# Patient Record
Sex: MALE | Race: WHITE | Employment: FULL TIME | URBAN - NONMETROPOLITAN AREA
[De-identification: names, ages, dates, MRNs, and addresses within clinical notes are randomized per-mention and may not be internally consistent; named-entity substitution may affect disease eponyms.]

---

## 2022-09-17 ENCOUNTER — HOSPITAL ENCOUNTER (EMERGENCY)
Age: 32
Discharge: HOME | End: 2022-09-18
Attending: STUDENT IN AN ORGANIZED HEALTH CARE EDUCATION/TRAINING PROGRAM
Payer: COMMERCIAL

## 2022-09-17 VITALS
RESPIRATION RATE: 18 BRPM | BODY MASS INDEX: 29.66 KG/M2 | WEIGHT: 219 LBS | SYSTOLIC BLOOD PRESSURE: 130 MMHG | TEMPERATURE: 99 F | HEIGHT: 72 IN | HEART RATE: 111 BPM | OXYGEN SATURATION: 97 % | DIASTOLIC BLOOD PRESSURE: 83 MMHG

## 2022-09-17 DIAGNOSIS — J06.9 VIRAL UPPER RESPIRATORY TRACT INFECTION: Primary | ICD-10-CM

## 2022-09-17 PROCEDURE — 87804 INFLUENZA ASSAY W/OPTIC: CPT

## 2022-09-17 PROCEDURE — 6370000000 HC RX 637 (ALT 250 FOR IP): Performed by: STUDENT IN AN ORGANIZED HEALTH CARE EDUCATION/TRAINING PROGRAM

## 2022-09-17 PROCEDURE — 99283 EMERGENCY DEPT VISIT LOW MDM: CPT

## 2022-09-17 PROCEDURE — 87635 SARS-COV-2 COVID-19 AMP PRB: CPT

## 2022-09-17 RX ORDER — ACETAMINOPHEN 325 MG/1
650 TABLET ORAL ONCE
Status: COMPLETED | OUTPATIENT
Start: 2022-09-17 | End: 2022-09-17

## 2022-09-17 RX ADMIN — ACETAMINOPHEN 650 MG: 325 TABLET ORAL at 23:29

## 2022-09-17 ASSESSMENT — PAIN - FUNCTIONAL ASSESSMENT: PAIN_FUNCTIONAL_ASSESSMENT: NONE - DENIES PAIN

## 2022-09-18 LAB
FLU A ANTIGEN: NEGATIVE
FLU B ANTIGEN: NEGATIVE
SARS-COV-2, NAAT: NOT  DETECTED

## 2022-09-18 NOTE — ED PROVIDER NOTES
[facility] [address] [address] EMERGENCY DEPT  EMERGENCY DEPARTMENT     Pt Name: Mindy Schumacher  MRN: [id number]  [place] 1990  Date of evaluation: 9/17/2022  Provider: Rachel Browne MD,     [address] [place] [address]       Chief Complaint   Patient presents with    Covid Testing    Cough    Congestion       HISTORY OF PRESENT ILLNESS    Mindy Schumacher is a 32 y.o. male who presents to the emergency department from home with chief complaint of nasal congestion, rhinorrhea, nonproductive cough, sore throat, generalized body aches for the last 48 hours. He reports chills and fevers. He denies headache or neck pain. He denies abdominal pain, nausea, vomiting, diarrhea, constipation. He denies rash. Triage notes and Nursing notes were reviewed by myself. Any discrepancies are addressed above. PAST MEDICAL HISTORY   History reviewed. No pertinent past medical history. SURGICAL HISTORY     History reviewed. No pertinent surgical history. CURRENT MEDICATIONS       Previous Medications    No medications on file       ALLERGIES     Patient has no known allergies. FAMILY HISTORY     History reviewed. No pertinent family history. SOCIAL HISTORY       Social History     Tobacco Use    Smoking status: Every Day     Packs/day: 1.00     Types: Cigarettes    Smokeless tobacco: Never   Substance and Sexual Activity    Alcohol use: Yes    Drug use: Never       REVIEW OF SYSTEMS     Review of Systems  - CONSTITUTIONAL: Reports fever and chills    - HEENT: Rhinorrhea, nasal congestion, sore throat    -   RESPIRATORY: Nonproductive cough      - CV: Denies palpitations and CP.       - GI: Denies abdominal pain, nausea, vomiting and diarrhea.      - : Denies dysuria and urinary frequency. - MSK: Generalized myalgias      - SKIN: Denies rash and pruritus.    -   NEUROLOGICAL: Denies headache and syncope. - PSYCHIATRIC: Denies recent changes in mood. Denies anxiety and depression.   Except as noted above the remainder of the review of systems was reviewed and is. PHYSICAL EXAM    (up to 7 for level 4, 8 or more for level 5)     ED Triage Vitals [09/17/22 2312]   BP Temp Temp Source Heart Rate Resp SpO2 Height Weight   130/83 99 °F (37.2 °C) Oral (!) 111 18 97 % 6' (1.829 m) 219 lb (99.3 kg)       Physical Exam  Constitutional:  Well developed, well nourished, no acute distress, non-toxic appearance   Eyes:  PERRL, conjunctiva normal   HENT:  Atraumatic, external ears normal, nose normal, oropharynx moist, no pharyngeal exudates. Clear rhinorrhea. Neck- normal range of motion, no tenderness, supple   Respiratory:  No respiratory distress, normal breath sounds, no rales, no wheezing   Cardiovascular: Tachycardia, normal rhythm, no murmurs, no gallops, no rubs    Musculoskeletal:  No edema, no tenderness, no deformities  Integument:  Well hydrated, no rash   Lymphatic:  No lymphadenopathy noted   Neurologic:  Alert & oriented x 3, no focal deficits noted   Psychiatric:  Speech and behavior appropriate  DIAGNOSTIC RESULTS     EKG:(none if blank)  All EKG's are interpreted [facility] Phys[facility] who either signs or Co-signs this chart in the absence of a cardiologist.    Not indicated    RADIOLOGY: (none if blank)   Interpretation per the Radiologistbelow, if available at the time of this note:    No orders to display       LABS:  Labs Reviewed   COVID-19, RAPID   RAPID INFLUENZA A/B ANTIGENS       All other labs were within normal range or not returned as of this dictation. Please note, any cultures that may have been sent were not resulted at the time of this patient visit. EMERGENCY DEPARTMENT COURSE andMedical Decision Making:     MDM  /  ED Course as of 09/18/22 0053   Sat Sep 17, 2022   2324 HPI/ROS/physical examination consistent with viral upper respiratory infection. Lungs clear to auscultation bilaterally no concern for underlying pneumonia considering recency of symptoms and other viral syndrome symptoms. Tylenol given in the ED. Plan for COVID-19 and influenza testing. [AM]   Sun Sep 18, 2022   0052 Flu and COVID-negative. Supportive care discussed. Return precaution discussed. PCP follow-up [AM]      ED Course User Index  [AM] Ann Joyce MD       Strict returnprecautions and follow up instructions were discussed with the patient with which the patient agrees    ED Medications administered this visit:    Medications   acetaminophen (TYLENOL) tablet 650 mg (has no administration in time range)         Procedures: (None if blank)       CLINICAL       1. Viral upper respiratory tract infection          DISPOSITION/PLAN   DISPOSITION    Home with PCP follow-up    PATIENT REFERRED TO:   [facility] [address] [place] [phone]  Call in 3 days          (Please note that portions of this note were completed with a voice recognition program.  Efforts were made to edit the dictations but occasionallywords are mis-transcribed. )      Ann Joyce MD,(electronically signed)  Attending Physician, Emergency Department         Ann Joyce MD  09/18/22 1234

## 2023-02-10 ENCOUNTER — APPOINTMENT (OUTPATIENT)
Dept: GENERAL RADIOLOGY | Age: 33
End: 2023-02-10
Payer: COMMERCIAL

## 2023-02-10 ENCOUNTER — HOSPITAL ENCOUNTER (EMERGENCY)
Age: 33
Discharge: HOME | End: 2023-02-10
Attending: STUDENT IN AN ORGANIZED HEALTH CARE EDUCATION/TRAINING PROGRAM
Payer: COMMERCIAL

## 2023-02-10 ENCOUNTER — APPOINTMENT (OUTPATIENT)
Dept: CT IMAGING | Age: 33
End: 2023-02-10
Payer: COMMERCIAL

## 2023-02-10 VITALS
OXYGEN SATURATION: 98 % | SYSTOLIC BLOOD PRESSURE: 104 MMHG | HEIGHT: 70 IN | HEART RATE: 84 BPM | TEMPERATURE: 97.7 F | DIASTOLIC BLOOD PRESSURE: 66 MMHG | BODY MASS INDEX: 28.63 KG/M2 | RESPIRATION RATE: 16 BRPM | WEIGHT: 200 LBS

## 2023-02-10 DIAGNOSIS — R41.82 ALTERED MENTAL STATUS, UNSPECIFIED ALTERED MENTAL STATUS TYPE: ICD-10-CM

## 2023-02-10 DIAGNOSIS — R56.9 SEIZURE-LIKE ACTIVITY (HCC): Primary | ICD-10-CM

## 2023-02-10 DIAGNOSIS — F12.90 MARIJUANA USE: ICD-10-CM

## 2023-02-10 LAB
ALBUMIN SERPL BCG-MCNC: 4.2 G/DL (ref 3.5–5.1)
ALP SERPL-CCNC: 97 U/L (ref 38–126)
ALT SERPL W/O P-5'-P-CCNC: 29 U/L (ref 11–66)
AMPHETAMINES UR QL SCN: NEGATIVE
ANION GAP SERPL CALC-SCNC: 13 MEQ/L (ref 8–16)
APAP SERPL-MCNC: < 5 UG/ML (ref 0–20)
AST SERPL-CCNC: 22 U/L (ref 5–40)
BACTERIA: ABNORMAL
BARBITURATES UR QL SCN: NEGATIVE
BENZODIAZ UR QL SCN: NEGATIVE
BILIRUB CONJ SERPL-MCNC: < 0.2 MG/DL (ref 0–0.3)
BILIRUB SERPL-MCNC: 0.3 MG/DL (ref 0.3–1.2)
BILIRUB UR QL STRIP: NEGATIVE
BUN SERPL-MCNC: 19 MG/DL (ref 7–22)
BZE UR QL SCN: NEGATIVE
CALCIUM SERPL-MCNC: 9 MG/DL (ref 8.5–10.5)
CANNABINOIDS UR QL SCN: POSITIVE
CASTS #/AREA URNS LPF: ABNORMAL /LPF
CASTS #/AREA URNS LPF: ABNORMAL /LPF
CHARACTER UR: CLEAR
CHARCOAL URNS QL MICRO: ABNORMAL
CHLORIDE SERPL-SCNC: 102 MEQ/L (ref 98–111)
CHP ED QC CHECK: YES
CK SERPL-CCNC: 206 U/L (ref 55–170)
CO2 SERPL-SCNC: 25 MEQ/L (ref 23–33)
COLOR UR: YELLOW
CREAT SERPL-MCNC: 0.9 MG/DL (ref 0.4–1.2)
CRYSTALS URNS QL MICRO: ABNORMAL
EPITHELIAL CELLS, UA: ABNORMAL /HPF
ETHANOL SERPL-MCNC: < 0.01 %
FENTANYL: NEGATIVE
GFR SERPL CREATININE-BSD FRML MDRD: > 60 ML/MIN/1.73M2
GLUCOSE BLD STRIP.AUTO-MCNC: 245 MG/DL (ref 70–108)
GLUCOSE BLD-MCNC: 245 MG/DL
GLUCOSE SERPL-MCNC: 159 MG/DL (ref 70–108)
GLUCOSE UR QL STRIP.AUTO: NEGATIVE MG/DL
HGB UR QL STRIP.AUTO: ABNORMAL
KETONES UR QL STRIP.AUTO: NEGATIVE
LEUKOCYTE ESTERASE UR QL STRIP.AUTO: NEGATIVE
MAGNESIUM SERPL-MCNC: 1.6 MG/DL (ref 1.6–2.4)
NITRITE UR QL STRIP.AUTO: NEGATIVE
OPIATES UR QL SCN: NEGATIVE
OXYCODONE: NEGATIVE
PCP UR QL SCN: NEGATIVE
PH UR STRIP.AUTO: 6.5 [PH] (ref 5–9)
POTASSIUM SERPL-SCNC: 3 MEQ/L (ref 3.5–5.2)
PROLACTIN SERPL-MCNC: 7.5 NG/ML
PROT SERPL-MCNC: 7 G/DL (ref 6.1–8)
PROT UR STRIP.AUTO-MCNC: NEGATIVE MG/DL
RBC #/AREA URNS HPF: ABNORMAL /HPF
RENAL EPI CELLS #/AREA URNS HPF: ABNORMAL /[HPF]
SALICYLATES SERPL-MCNC: < 0.3 MG/DL (ref 2–10)
SCAN OF BLOOD SMEAR: NORMAL
SODIUM SERPL-SCNC: 140 MEQ/L (ref 135–145)
SPECIFIC GRAVITY UA: 1.01 (ref 1–1.03)
TROPONIN T: < 0.01 NG/ML
UROBILINOGEN, URINE: 0.2 EU/DL (ref 0–1)
WBC #/AREA URNS HPF: ABNORMAL /HPF
YEAST LIKE FUNGI URNS QL MICRO: ABNORMAL

## 2023-02-10 PROCEDURE — 2580000003 HC RX 258: Performed by: EMERGENCY MEDICINE

## 2023-02-10 PROCEDURE — 99285 EMERGENCY DEPT VISIT HI MDM: CPT

## 2023-02-10 PROCEDURE — 96360 HYDRATION IV INFUSION INIT: CPT

## 2023-02-10 PROCEDURE — 6360000002 HC RX W HCPCS: Performed by: STUDENT IN AN ORGANIZED HEALTH CARE EDUCATION/TRAINING PROGRAM

## 2023-02-10 PROCEDURE — 81001 URINALYSIS AUTO W/SCOPE: CPT

## 2023-02-10 PROCEDURE — 84146 ASSAY OF PROLACTIN: CPT

## 2023-02-10 PROCEDURE — 85025 COMPLETE CBC W/AUTO DIFF WBC: CPT

## 2023-02-10 PROCEDURE — 36415 COLL VENOUS BLD VENIPUNCTURE: CPT

## 2023-02-10 PROCEDURE — 71045 X-RAY EXAM CHEST 1 VIEW: CPT

## 2023-02-10 PROCEDURE — 83735 ASSAY OF MAGNESIUM: CPT

## 2023-02-10 PROCEDURE — 80179 DRUG ASSAY SALICYLATE: CPT

## 2023-02-10 PROCEDURE — 82248 BILIRUBIN DIRECT: CPT

## 2023-02-10 PROCEDURE — 70450 CT HEAD/BRAIN W/O DYE: CPT

## 2023-02-10 PROCEDURE — 82948 REAGENT STRIP/BLOOD GLUCOSE: CPT

## 2023-02-10 PROCEDURE — 6370000000 HC RX 637 (ALT 250 FOR IP): Performed by: EMERGENCY MEDICINE

## 2023-02-10 PROCEDURE — 80143 DRUG ASSAY ACETAMINOPHEN: CPT

## 2023-02-10 PROCEDURE — 2580000003 HC RX 258: Performed by: STUDENT IN AN ORGANIZED HEALTH CARE EDUCATION/TRAINING PROGRAM

## 2023-02-10 PROCEDURE — 96367 TX/PROPH/DG ADDL SEQ IV INF: CPT

## 2023-02-10 PROCEDURE — 82550 ASSAY OF CK (CPK): CPT

## 2023-02-10 PROCEDURE — 6360000002 HC RX W HCPCS: Performed by: EMERGENCY MEDICINE

## 2023-02-10 PROCEDURE — 80053 COMPREHEN METABOLIC PANEL: CPT

## 2023-02-10 PROCEDURE — 84484 ASSAY OF TROPONIN QUANT: CPT

## 2023-02-10 PROCEDURE — 80307 DRUG TEST PRSMV CHEM ANLYZR: CPT

## 2023-02-10 PROCEDURE — 96366 THER/PROPH/DIAG IV INF ADDON: CPT

## 2023-02-10 PROCEDURE — 96365 THER/PROPH/DIAG IV INF INIT: CPT

## 2023-02-10 PROCEDURE — 82077 ASSAY SPEC XCP UR&BREATH IA: CPT

## 2023-02-10 PROCEDURE — 93005 ELECTROCARDIOGRAM TRACING: CPT | Performed by: EMERGENCY MEDICINE

## 2023-02-10 RX ORDER — POTASSIUM CHLORIDE 7.45 MG/ML
10 INJECTION INTRAVENOUS
Status: DISCONTINUED | OUTPATIENT
Start: 2023-02-10 | End: 2023-02-10

## 2023-02-10 RX ORDER — SODIUM CHLORIDE, SODIUM LACTATE, POTASSIUM CHLORIDE, AND CALCIUM CHLORIDE .6; .31; .03; .02 G/100ML; G/100ML; G/100ML; G/100ML
1000 INJECTION, SOLUTION INTRAVENOUS ONCE
Status: COMPLETED | OUTPATIENT
Start: 2023-02-10 | End: 2023-02-10

## 2023-02-10 RX ORDER — AMMONIA INHALANTS 0.04 G/.3ML
INHALANT RESPIRATORY (INHALATION)
Status: DISCONTINUED
Start: 2023-02-10 | End: 2023-02-10 | Stop reason: HOSPADM

## 2023-02-10 RX ADMIN — POTASSIUM BICARBONATE 40 MEQ: 782 TABLET, EFFERVESCENT ORAL at 21:09

## 2023-02-10 RX ADMIN — SODIUM CHLORIDE, POTASSIUM CHLORIDE, SODIUM LACTATE AND CALCIUM CHLORIDE 1000 ML: 600; 310; 30; 20 INJECTION, SOLUTION INTRAVENOUS at 18:08

## 2023-02-10 RX ADMIN — CEFTRIAXONE SODIUM 1000 MG: 1 INJECTION, POWDER, FOR SOLUTION INTRAMUSCULAR; INTRAVENOUS at 18:47

## 2023-02-10 RX ADMIN — POTASSIUM CHLORIDE 10 MEQ: 7.46 INJECTION, SOLUTION INTRAVENOUS at 20:02

## 2023-02-10 ASSESSMENT — PAIN - FUNCTIONAL ASSESSMENT: PAIN_FUNCTIONAL_ASSESSMENT: NONE - DENIES PAIN

## 2023-02-10 NOTE — ED TRIAGE NOTES
Pt to rm 04 per intake- pt removed from a car in the ER drive reported pt having a seizure. On arrival pt sitting up in a WC intermittently flailing his arms and bobbing his head. Pt placed on ER cart- arm raised over his head pt will drop to his side, pt holding his eyes tightly closed when attempting to assess his pupils. Pt had vomited on himself PTA- friend at bedside states she just met him 'in person' today and she doesn't really know anything about him or his medical hx other than he told her he was schizophrenic.  Pt continues to flail his arms and shake his head slowly from side to side- Dr Gregoria Ruffin at bedside

## 2023-02-10 NOTE — ED PROVIDER NOTES
[facility] [address] [address] EMERGENCY DEPT      EMERGENCY MEDICINE     Pt Name: Mehdi James  MRN: [id number]  [place] 1990  Date of evaluation: 2/10/2023  Provider: Osbaldo Mcdonald DO  Supervising Physician: Doris Weiss MD    CHIEF COMPLAINT       Chief Complaint   Patient presents with    Seizures     Reported seizure PTA     HISTORY OF PRESENT ILLNESS   Mehdi James is a pleasant 28 y.o. male who presents to the emergency department via private vehicle for evaluation of possible seizure. He presents with a friend who has only ever talked him on the phone, this was the first time she had a face-to-face, she is the primary historian. Friend states she had been with the patient for about an hour when he started hyperventilating in her car that he threw up a couple times then he started having seizure-like activity. Patient has arms flexed and held to his chest.  Upon arrival he was shaking his head side to side and kicking his legs out in and alternating motion. Reportedly patient told his friend that he has a history of schizophrenia. She notes that he smoked a dab which has THC in it, prior to arrival.  It is also noted that he regularly drinks energy drinks and smokes tobacco.    PASTMEDICAL HISTORY   No past medical history on file. There is no problem list on file for this patient. SURGICAL HISTORY     No past surgical history on file. CURRENT MEDICATIONS       Previous Medications    No medications on file       ALLERGIES     has No Known Allergies. FAMILY HISTORY     has no family status information on file.         SOCIAL HISTORY       Social History     Tobacco Use    Smoking status: Every Day     Packs/day: 1.00     Types: Cigarettes    Smokeless tobacco: Never   Substance Use Topics    Alcohol use: Yes    Drug use: Never       PHYSICAL EXAM       ED Triage Vitals [02/10/23 1743]   BP Temp Temp Source Heart Rate Resp SpO2 Height Weight   135/85 97.7 °F (36.5 °C) Axillary (!) 120 20 90 % 5' 10\" (1.778 m) 200 lb (90.7 kg)       Physical Exam  Vitals and nursing note reviewed. Constitutional:       Appearance: He is not ill-appearing or toxic-appearing. Comments: Pants covered in vomit. Pt not alert. Initially Pt is holding his eyes closed shut, but he is able to follow commands. Non-verbal   HENT:      Head: Normocephalic and atraumatic. Right Ear: External ear normal.      Left Ear: External ear normal.      Nose: Nose normal.      Mouth/Throat:      Mouth: Mucous membranes are dry. Eyes:      Conjunctiva/sclera: Conjunctivae normal.      Comments: Pupils 6-7 mm, equal, round, reactive   Cardiovascular:      Rate and Rhythm: Regular rhythm. Tachycardia present. Heart sounds: No murmur heard. No friction rub. No gallop. Pulmonary:      Effort: Pulmonary effort is normal.      Breath sounds: Normal breath sounds. No wheezing, rhonchi or rales. Abdominal:      General: Abdomen is flat. Palpations: Abdomen is soft. Musculoskeletal:         General: Normal range of motion. Skin:     General: Skin is warm. FORMAL DIAGNOSTIC RESULTS     RADIOLOGY: Interpretation per the Radiologist below, if available at the time of this note (none if blank):    CT Head W/O Contrast   Final Result   1. No acute intracranial abnormality. **This report has been created using voice recognition software. It may contain minor errors which are inherent in voice recognition technology. **      Final report electronically signed by Dr Gurvinder Beavers on 2/10/2023 7:38 PM      XR CHEST PORTABLE   Final Result   1. No acute cardiopulmonary finding. **This report has been created using voice recognition software. It may contain minor errors which are inherent in voice recognition technology. **      Final report electronically signed by Dr Gurvinder Beavers on 2/10/2023 6:57 PM          LABS: (none if blank)  Labs Reviewed   CBC WITH AUTO DIFFERENTIAL - Abnormal; Notable for the following components:       Result Value    WBC 16.6 (*)     Hematocrit 41.4 (*)     All other components within normal limits   BASIC METABOLIC PANEL - Abnormal; Notable for the following components:    Potassium 3.0 (*)     Glucose 159 (*)     All other components within normal limits   CK - Abnormal; Notable for the following components: Total  (*)     All other components within normal limits   URINALYSIS WITH MICROSCOPIC - Abnormal; Notable for the following components:    Blood, Urine TRACE (*)     All other components within normal limits   SALICYLATE LEVEL - Abnormal; Notable for the following components:    Salicylate, Serum < 0.3 (*)     All other components within normal limits   POCT GLUCOSE - Abnormal; Notable for the following components:    POC Glucose 245 (*)     All other components within normal limits   POCT GLUCOSE - Normal   TROPONIN   MAGNESIUM   HEPATIC FUNCTION PANEL   PROLACTIN   ETHANOL   URINE DRUG SCREEN   ACETAMINOPHEN LEVEL   ANION GAP   GLOMERULAR FILTRATION RATE, ESTIMATED   SCAN OF BLOOD SMEAR       (Any cultures that may have been sent were not resulted at the time of this patient visit)    Santo Childs / ED COURSE:     1) Number and Complexity of Problems            Problem List This Visit:         Chief Complaint   Patient presents with    Seizures     Reported seizure PTA            Differential Diagnosis includes (but not limited to):  PNES, Reaction to THC, seizure, hypoglycemia, electrolyte abnormality, arrhythmia, prolonged QTc, polysubstance abuse, alcohol abuse, rhabdomyolysis            Pertinent Comorbid Conditions:    None    2)  Data Reviewed (none if left blank)          My Independent interpretations:     EKG:      Normal sinus rhythm.   Normal rate, normal rhythm, right axis deviation, no ST elevation, normal QRS, normal QTc    Imaging: CT head and chest x-ray are negative for any acute abnormalities    Labs:      Potassium 3.0, WBC of 16, this is possibly reactive. Urine is positive for cannabinoids. CK is 206, this is slightly elevated but not to the point probably concern for rhabdomyolysis. Other lab work is reviewed unremarkable                 Decision Rules/Clinical Scores utilized: N/A            External Documentation Reviewed:         Previous patient encounter documents & history available on EMR was reviewed provider note on 9/17/2022             See Formal Diagnostic Results above for the lab and radiology tests and orders. 3)  Treatment and Disposition         ED Reassessment:  See ED course         Case discussed with consulting clinician: N/A         Shared Decision-Making was performed and disposition discussed with the        Patient/Family and questions answered yes         Social determinants of health impacting treatment or disposition: Smokes tobacco         Code Status: Full      Summary of Patient Presentation:      MDM  Number of Diagnoses or Management Options  Altered mental status, unspecified altered mental status type: new, needed workup  Marijuana use: new, needed workup  Seizure-like activity ([facility] [address]): new, needed workup  Diagnosis management comments: Patient is a previously healthy 68-year-old male who presents for evaluation of vomiting and seizure-like activity. Upon arrival patient is alternating kicking his legs back-and-forth, twisting his head back-and-forth, and closing his eyes tightly shut. 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 He is not hypoglycemic. Upon arrival he was tachycardic and satting in the high 80s. Tachycardia improved with IVF. He was placed on 3L O2, and slowly weaned off to RA, now satting 100%. Since we didn't have a good story initially about the Pt's medical history, we decided to do a broad work up. See ED course.  I considered muscarinic and antimuscarinic toxicity, but I do not think this was the case here. Pt is GCS 15, A&O x4, will D/C. Amount and/or Complexity of Data Reviewed  Clinical lab tests: reviewed and ordered  Tests in the radiology section of CPT®: ordered and reviewed  Tests in the medicine section of CPT®: ordered and reviewed  Discuss the patient with other providers: yes  Independent visualization of images, tracings, or specimens: yes    Patient Progress  Patient progress: improved  /  ED Course as of 02/10/23 2137   Fri Feb 10, 2023   1851 Cannabinoid Quant, Ur: POSITIVE [AC]   1914 Potassium(!): 3.0 [AC]   1924 I re-evaluated the patient at around 7:15 PM.  The patient's parents were in the room and reported he has never acted in a similar fashion in the past.  When I examined the patient he was not responding to verbal or painful stimuli however was continuing to force his eyes shut when I attempted to examine his pupils. At that time I explained to the family that if the patient's condition does not improve then we will have to intubate him and place him on a ventilator for airway protection. I gave the family a few minutes to speak to the patient's and upon my return at 5:23 PM the patient is now awake and answering yes and no questions by nodding and shaking his head. He is moving all of his extremities equally. He indicates that his mouth is dry hence he is not speaking. He no longer is a candidate for intubation as he is adequately protecting his airway. Will reevaluate shortly. CT brain pending. [AM]   1929 Pt is following commands, opening his eyes, protecting his airway. He is not yet verbal but can nod his head to answer questions. [AC]   [id number] Will work on repleting his potassium using only IV for now.  Once Pt is verbal and I am more confident he will not aspirate, I will give PO potassium [AC]   1942 Pt still not verbal. When asked if he isn't speaking b/c his mouth is dry he nods his head, will provide him with something to moisten his mouth [AC]   2012 Pt is now A&O x4 [AC]   2127 Pt has refused to have an appt set up with the resident's clinic [AC]      ED Course User Index  [AC] Fatemeh Henriquez DO  [AM] Osorio Tellez MD     Vitals Reviewed:    Vitals:    02/10/23 1816 02/10/23 1843 02/10/23 2004 02/10/23 2113   BP: 124/67 115/64 108/61 104/66   Pulse: 90 82 74 84   Resp:  18 16 16   Temp:       TempSrc:       SpO2: 93% 95% 99% 98%   Weight:       Height:           The patient was seen and examined. Appropriate diagnostic testing was performed and results reviewed with the patient. The results of pertinent diagnostic studies and exam findings were discussed. The patients provisional diagnosis and plan of care were discussed with the patient and present family who expressed understanding. Any medications were reviewed and indications and risks of medications were discussed with the patient /family present. Strict verbal and written return precautions, instructions and appropriate follow-up provided to  the patient. ED Medications administered this visit:  (None if blank)  Medications   ammonia inhaler (has no administration in time range)   lactated ringers bolus (0 mLs IntraVENous Stopped 2/10/23 1848)   cefTRIAXone (ROCEPHIN) 1,000 mg in sodium chloride 0.9 % 50 mL IVPB (mini-bag) (0 mg IntraVENous Stopped 2/10/23 2002)   potassium bicarb-citric acid (EFFER-K) effervescent tablet 40 mEq (40 mEq Oral Given 2/10/23 2109)         PROCEDURES: (None if blank)  Procedures:     CRITICAL CARE: (None if blank)      DISCHARGE PRESCRIPTIONS: (None if blank)  New Prescriptions    No medications on file       FINAL IMPRESSION      1. Seizure-like activity ([facility] [address]    2. Altered mental status, unspecified altered mental status type    3.  Marijuana use          DISPOSITION/PLAN   DISPOSITION Decision To Discharge 02/10/2023 09:24:03 PM      OUTPATIENT FOLLOW UP THE PATIENT:  [facility] [address] [address] EMERGENCY DEPT  [address] [facility] [address]  [phone]    If symptoms worsen    [address] [place] [address]  [phone]  In 2 days  For further evaluation    Dora Isidro DO    This transcription was electronically signed. Parts of this transcriptions may have been dictated by use of voice recognition software and electronically transcribed, and parts may have been transcribed with the assistance of an ED scribe. The transcription may contain errors not detected in proofreading. Please refer to my supervising physician's documentation if my documentation differs.     Electronically Signed: Dora Isidro DO, 02/10/23, 9:37 PM      Dora Isidro DO  Resident  02/10/23 1287

## 2023-02-10 NOTE — ED NOTES
CXR at bedside. Pt wakes to his name being called and will follow some commands.       Mary Gomez RN  02/10/23 2619

## 2023-02-11 LAB
BASOPHILS ABSOLUTE: 0.1 THOU/MM3 (ref 0–0.1)
BASOPHILS NFR BLD AUTO: 0.7 %
DEPRECATED RDW RBC AUTO: 40.2 FL (ref 35–45)
EKG ATRIAL RATE: 104 BPM
EKG P AXIS: 47 DEGREES
EKG P-R INTERVAL: 182 MS
EKG Q-T INTERVAL: 356 MS
EKG QRS DURATION: 104 MS
EKG QTC CALCULATION (BAZETT): 468 MS
EKG R AXIS: 91 DEGREES
EKG T AXIS: 47 DEGREES
EKG VENTRICULAR RATE: 104 BPM
EOSINOPHIL NFR BLD AUTO: 3.9 %
EOSINOPHILS ABSOLUTE: 0.6 THOU/MM3 (ref 0–0.4)
ERYTHROCYTE [DISTWIDTH] IN BLOOD BY AUTOMATED COUNT: 12.8 % (ref 11.5–14.5)
HCT VFR BLD AUTO: 41.4 % (ref 42–52)
HGB BLD-MCNC: 14.6 GM/DL (ref 14–18)
IMM GRANULOCYTES # BLD AUTO: 0.09 THOU/MM3 (ref 0–0.07)
IMM GRANULOCYTES NFR BLD AUTO: 0.5 %
LYMPHOCYTES ABSOLUTE: 8.1 THOU/MM3 (ref 1–4.8)
LYMPHOCYTES NFR BLD AUTO: 48.8 %
MCH RBC QN AUTO: 30.6 PG (ref 26–33)
MCHC RBC AUTO-ENTMCNC: 35.3 GM/DL (ref 32.2–35.5)
MCV RBC AUTO: 86.8 FL (ref 80–94)
MONOCYTES ABSOLUTE: 1.2 THOU/MM3 (ref 0.4–1.3)
MONOCYTES NFR BLD AUTO: 7.5 %
NEUTROPHILS NFR BLD AUTO: 38.6 %
NRBC BLD AUTO-RTO: 0 /100 WBC
PATHOLOGIST REVIEW: ABNORMAL
PLATELET # BLD AUTO: 270 THOU/MM3 (ref 130–400)
PLATELET BLD QL SMEAR: ADEQUATE
PMV BLD AUTO: 9.4 FL (ref 9.4–12.4)
RBC # BLD AUTO: 4.77 MILL/MM3 (ref 4.7–6.1)
SEGMENTED NEUTROPHILS ABSOLUTE COUNT: 6.4 THOU/MM3 (ref 1.8–7.7)
VARIANT LYMPHS BLD QL SMEAR: ABNORMAL %
WBC # BLD AUTO: 16.6 THOU/MM3 (ref 4.8–10.8)

## 2023-02-11 PROCEDURE — 93010 ELECTROCARDIOGRAM REPORT: CPT | Performed by: INTERNAL MEDICINE

## 2023-02-11 NOTE — ED NOTES
Patient given oral mouth swabs at this time. Patient shakes his head yes when asked if he wants more and also tells mother his name. Vital signs stable.       Susan Flor RN  02/10/23 2009

## 2023-02-11 NOTE — ED NOTES
Bedside report received from [facility].  Dr [place] and Dr Giles Oscar at bedside speaking with patient at this time      He Liu RN  02/10/23 8448

## 2023-02-11 NOTE — ED NOTES
Pt resting on cot with eyes closed, pt responsive to voice. Pt family remains at bedside. VSS.  Will continue to monitor     Abi Mejia RN  02/10/23 2006

## 2023-02-11 NOTE — ED NOTES
Pt resting on cot, sitting up and conversing with family. Pt alert and oriented, respirations are equal and unlabored.  Pt denies needs at this time,      Abi Mejia, [place]  02/10/23 2115

## 2023-02-11 NOTE — ED NOTES
Provider at bedside at this time. Pt speaking with provider and answering questions appropriately.  Provider educated patient and family that IV bag of potassium hanging at this time will be finished then, oral potassium will supplement in place of the remaining 2 bags of potassium ordered     MARIANNE STILES RN  02/10/23 2013

## 2023-02-11 NOTE — ED NOTES
When conversing with family, pt was noted to have started to laughing when this RN was speaking with family about events leading up to his visit today.       [facility]  02/10/23 2011

## 2023-02-11 NOTE — DISCHARGE INSTRUCTIONS
Follow up with a primary care doctor or a neurologist within the next few days. I would advise avoiding any THC or marijuana use. Return here if symptoms worsen.

## 2023-04-11 NOTE — ED TRIAGE NOTES
Pt arrives requesting COVID testing. Pt reports cough, fever and congestion beginning yesterday. Pt is well appearing. No medical hx.
normal/clear to auscultation bilaterally/no wheezes/no rales/no rhonchi